# Patient Record
Sex: FEMALE | Race: WHITE
[De-identification: names, ages, dates, MRNs, and addresses within clinical notes are randomized per-mention and may not be internally consistent; named-entity substitution may affect disease eponyms.]

---

## 2017-07-23 ENCOUNTER — HOSPITAL ENCOUNTER (EMERGENCY)
Dept: HOSPITAL 62 - ER | Age: 32
Discharge: HOME | End: 2017-07-23
Payer: OTHER GOVERNMENT

## 2017-07-23 VITALS — DIASTOLIC BLOOD PRESSURE: 61 MMHG | SYSTOLIC BLOOD PRESSURE: 130 MMHG

## 2017-07-23 DIAGNOSIS — R30.0: Primary | ICD-10-CM

## 2017-07-23 DIAGNOSIS — R11.0: ICD-10-CM

## 2017-07-23 DIAGNOSIS — R10.32: ICD-10-CM

## 2017-07-23 DIAGNOSIS — R10.9: ICD-10-CM

## 2017-07-23 LAB
ALBUMIN SERPL-MCNC: 3.9 G/DL (ref 3.5–5)
ALP SERPL-CCNC: 73 U/L (ref 38–126)
ALT SERPL-CCNC: 24 U/L (ref 9–52)
ANION GAP SERPL CALC-SCNC: 13 MMOL/L (ref 5–19)
APPEARANCE UR: CLEAR
AST SERPL-CCNC: 15 U/L (ref 14–36)
BASOPHILS # BLD AUTO: 0.1 10^3/UL (ref 0–0.2)
BASOPHILS NFR BLD AUTO: 1 % (ref 0–2)
BILIRUB DIRECT SERPL-MCNC: 0.2 MG/DL (ref 0–0.4)
BILIRUB SERPL-MCNC: 0.4 MG/DL (ref 0.2–1.3)
BILIRUB UR QL STRIP: NEGATIVE
BUN SERPL-MCNC: 9 MG/DL (ref 7–20)
CALCIUM: 9 MG/DL (ref 8.4–10.2)
CHLORIDE SERPL-SCNC: 104 MMOL/L (ref 98–107)
CO2 SERPL-SCNC: 23 MMOL/L (ref 22–30)
CREAT SERPL-MCNC: 0.75 MG/DL (ref 0.52–1.25)
EOSINOPHIL # BLD AUTO: 0.5 10^3/UL (ref 0–0.6)
EOSINOPHIL NFR BLD AUTO: 4.5 % (ref 0–6)
ERYTHROCYTE [DISTWIDTH] IN BLOOD BY AUTOMATED COUNT: 13.9 % (ref 11.5–14)
GLUCOSE SERPL-MCNC: 111 MG/DL (ref 75–110)
GLUCOSE UR STRIP-MCNC: NEGATIVE MG/DL
HCT VFR BLD CALC: 38.4 % (ref 36–47)
HGB BLD-MCNC: 13.1 G/DL (ref 12–15.5)
HGB HCT DIFFERENCE: 0.9
KETONES UR STRIP-MCNC: NEGATIVE MG/DL
LYMPHOCYTES # BLD AUTO: 2.5 10^3/UL (ref 0.5–4.7)
LYMPHOCYTES NFR BLD AUTO: 23.9 % (ref 13–45)
MCH RBC QN AUTO: 27.9 PG (ref 27–33.4)
MCHC RBC AUTO-ENTMCNC: 34.1 G/DL (ref 32–36)
MCV RBC AUTO: 82 FL (ref 80–97)
MONOCYTES # BLD AUTO: 0.8 10^3/UL (ref 0.1–1.4)
MONOCYTES NFR BLD AUTO: 7.4 % (ref 3–13)
NEUTROPHILS # BLD AUTO: 6.5 10^3/UL (ref 1.7–8.2)
NEUTS SEG NFR BLD AUTO: 63.2 % (ref 42–78)
NITRITE UR QL STRIP: POSITIVE
PH UR STRIP: 5 [PH] (ref 5–9)
POTASSIUM SERPL-SCNC: 4.2 MMOL/L (ref 3.6–5)
PROT SERPL-MCNC: 7.1 G/DL (ref 6.3–8.2)
PROT UR STRIP-MCNC: NEGATIVE MG/DL
RBC # BLD AUTO: 4.69 10^6/UL (ref 3.72–5.28)
SODIUM SERPL-SCNC: 139.7 MMOL/L (ref 137–145)
SP GR UR STRIP: 1.01
UROBILINOGEN UR-MCNC: 4 MG/DL (ref ?–2)
WBC # BLD AUTO: 10.3 10^3/UL (ref 4–10.5)

## 2017-07-23 PROCEDURE — 96372 THER/PROPH/DIAG INJ SC/IM: CPT

## 2017-07-23 PROCEDURE — 36415 COLL VENOUS BLD VENIPUNCTURE: CPT

## 2017-07-23 PROCEDURE — 81001 URINALYSIS AUTO W/SCOPE: CPT

## 2017-07-23 PROCEDURE — 99284 EMERGENCY DEPT VISIT MOD MDM: CPT

## 2017-07-23 PROCEDURE — 87086 URINE CULTURE/COLONY COUNT: CPT

## 2017-07-23 PROCEDURE — 76380 CAT SCAN FOLLOW-UP STUDY: CPT

## 2017-07-23 PROCEDURE — 81025 URINE PREGNANCY TEST: CPT

## 2017-07-23 PROCEDURE — 80053 COMPREHEN METABOLIC PANEL: CPT

## 2017-07-23 PROCEDURE — 85025 COMPLETE CBC W/AUTO DIFF WBC: CPT

## 2017-07-23 PROCEDURE — S0119 ONDANSETRON 4 MG: HCPCS

## 2017-07-23 NOTE — RADIOLOGY REPORT (SQ)
EXAM DESCRIPTION:  CT LTD RENAL STONE PROTOCOL ON



COMPLETED DATE/TIME:  7/23/2017 9:20 pm



REASON FOR STUDY:  left flank pain, UTI, ? obstructing stone



COMPARISON:  None.



TECHNIQUE:  CT scan of the abdomen and pelvis performed without intravenous or oral contrast. Images 
reviewed with lung, soft tissue, and bone windows. Reconstructed coronal and sagittal MPR images revi
ewed. All images stored on PACS.

All CT scanners at this facility use dose modulation, iterative reconstruction, and/or weight based d
osing when appropriate to reduce radiation dose to as low as reasonably achievable (ALARA).

[facility]: Dose Right  [facility]: CareDose    [facility]: Dose Right    [facility]: Teradose 4D    [facility]: Smart Technologies



RADIATION DOSE:  Up-to-date CT equipment and radiation dose reduction techniques were employed. CTDIv
ol: 18.9 mGy. DLP: 1019 mGy-cm.mGy.



LIMITATIONS:  None.



FINDINGS:  LOWER CHEST: No significant findings. No nodules or infiltrates.

NON-CONTRASTED LIVER, SPLEEN, ADRENALS: Evaluation limited by lack of IV contrast. No identified sign
ificant masses.

PANCREAS: No masses. No peripancreatic inflammatory changes.

GALLBLADDER: No identified stones by CT criteria. No inflammatory changes to suggest cholecystitis.

RIGHT KIDNEY AND URETER: No suspicious masses. Assessment limited by lack of IV contrast.   No signif
icant calcifications.   No hydronephrosis or hydroureter.

LEFT KIDNEY AND URETER: No suspicious masses. Assessment limited by lack of IV contrast.   No signifi
cant calcifications.   No hydronephrosis or hydroureter.

AORTA AND RETROPERITONEUM: No aneurysm. No retroperitoneal masses or adenopathy.

BOWEL AND PERITONEAL CAVITY: No obvious masses or inflammatory changes. No free fluid.  Multiple prom
inent mesenteric lymph nodes are identified.  This is a nonspecific finding but could be related to a
 mesenteric adenitis.

APPENDIX: Normal.

PELVIS, BLADDER, AND ABDOMINAL WALL:No abnormal masses. No free fluid. Bladder normal.

BONES: No significant findings.

OTHER: No other significant finding.



IMPRESSION:  Multiple prominent mesenteric lymph nodes are identified as noted above.  This is a nons
pecific finding but could be related to mesenteric adenitis.  No renal or ureteric calculi are identi
fied.  Other findings as noted above



TECHNICAL DOCUMENTATION:  JOB ID:  [id number]

Quality ID # [id number]: Final reports with documentation of one or more dose reduction techniques (e.g., Au
tomated exposure control, adjustment of the mA and/or kV according to patient size, use of iterative 
reconstruction technique)

 2011 [employer]- All Rights Reserved

## 2017-07-23 NOTE — ER DOCUMENT REPORT
ED GI/





- General


Chief Complaint: Possible Kidney Stone


Stated Complaint: PAIN URINATING/HEADACHE/LEFT FLANK PAIN


Time Seen by Provider: 17 19:55


Notes: 


Patient is a 32-year-old female that comes emergency department for chief 

complaint of flank pain that is worse on the left side and radiating around to 

her left lower abdomen, nausea, and also "pain in the urethra intermittently".  

She states last time she had this symptom she had a kidney stone. She denies 

fever. She was placed on Macrobid and Pyridium 3 days ago, states she is has 

not improved. She denies vaginal discharge or bleeding. She states she suffers 

from chronic urinary tract infections.  She denies any daily medications, only 

surgeries reported are C-sections.  She denies any other medical history.


TRAVEL OUTSIDE OF THE [place] IN LAST 30 DAYS: No





- Related Data


Allergies/Adverse Reactions: 


 





amoxicillin Allergy (Verified 17 19:48)


 


metoclopramide [From Reglan] Allergy (Verified 17 19:48)


 


Penicillins Allergy (Verified 17 19:38)


 


prochlorperazine [From Compazine] Allergy (Verified 17 19:38)


 


promethazine [From Phenergan] Allergy (Verified 17 19:38)


 


Sulfa (Sulfonamide Antibiotics) Allergy (Verified 17 19:38)


 











Past Medical History





- General


Information source: Patient





- Social History


Smoking Status: Never Smoker


Frequency of alcohol use: None


Drug Abuse: None


Lives with: Family


Family History: Reviewed & Not Pertinent


Renal/ Medical History: Reports: Hx Kidney Stones.  Denies: Hx Peritoneal 

Dialysis


Musculoskeltal Medical History: Reports Hx Arthritis - RA


Past Surgical History: Reports: Hx  Section, Hx Oral Surgery - wisdom 

teeth





Review of Systems





- Review of Systems


Constitutional: No symptoms reported


EENT: No symptoms reported


Cardiovascular: No symptoms reported


Respiratory: No symptoms reported


Gastrointestinal: See HPI


Genitourinary: See HPI


Female Genitourinary: No symptoms reported


Musculoskeletal: No symptoms reported


Skin: No symptoms reported


Hematologic/Lymphatic: No symptoms reported


Neurological/Psychological: No symptoms reported





Physical Exam





- Vital signs


Vitals: 


 











Temp Pulse Resp BP Pulse Ox


 


 98.5 F   99   20   130/61 H  99 


 


 17 19:37  17 19:37  17 19:37  17 19:37  17 19:37











Interpretation: Normal





- General


General appearance: Appears well, Alert


In distress: None





- HEENT


Head: Normocephalic, Atraumatic


Eyes: Normal


Conjunctiva: Normal


Extraocular movements intact: Yes


Eyelashes: Normal


Pupils: PERRL


Nasal: Normal


Mouth/Lips: Normal


Mucous membranes: Normal


Pharynx: Normal


Neck: Normal





- Respiratory


Respiratory status: No respiratory distress


Chest status: Nontender


Breath sounds: Normal.  No: Decreased air movement, Wheezing


Chest palpation: Normal





- Cardiovascular


Rhythm: Regular.  No: Tachycardia


Heart sounds: Normal auscultation, S1 appreciated, S2 appreciated


Murmur: No





- Abdominal


Inspection: Normal


Distension: No distension


Bowel sounds: Normal


Tenderness: Nontender.  No: Tender, Guarding - Completely nontender and every 

quadrant, no rigidity, no guarding


Organomegaly: No organomegaly





- Back


Back: Normal, Nontender.  No: Tender, CVA tenderness





- Extremities


General upper extremity: Normal inspection, Nontender, Normal color, Normal ROM

, Normal temperature


General lower extremity: Normal inspection, Nontender, Normal color, Normal ROM

, Normal temperature, Normal weight bearing.  No: Ana's sign





- Neurological


Neuro grossly intact: Yes


Cognition: Normal


Orientation: AAOx4


[place] Coma Scale Eye Opening: Spontaneous


Sim Coma Scale Verbal: Oriented


[place] Coma Scale Motor: Obeys Commands


Sim Coma Scale Total: 15


Speech: Normal


Motor strength normal: LUE, RUE, LLE, RLE


Sensory: Normal





- Psychological


Associated symptoms: Normal affect, Normal mood





- Skin


Skin Temperature: Warm


Skin Moisture: Dry


Skin Color: Normal





Course





- Re-evaluation


Re-evalutation: 


Patient does not appear to be in any distress, no tachycardia, no hypotension, 

no fever.  CBC and chemistry unremarkable.





Patient does not have CVA tenderness or abdominal tenderness on my examination.

  Patient insists that she is having stone, there is no recent imaging, she 

patient states she has not had any recent imaging either here or back home.  

Patient requests a CAT scan.  Urine does show nitrates, red blood cells.  We 

will perform CAT scan to rule out obstructive stone with infection.





CT shows no nephrolithiasis or ureterolithiasis, does show incidental 

mesenteric lymphadenopathy, however patient has no fever, leukocytosis, or 

other concerning abnormality suggesting underlying concerning abnormality other 

than urinary tract infection.  Patient given Rocephin, will be placed on Keflex

, urine culture, discussed results with patient in detail, discussed follow-up, 

discussed return precautions, patient states understanding and agreement.





- Vital Signs


Vital signs: 


 











Temp Pulse Resp BP Pulse Ox


 


 98.5 F   99   20   130/61 H  99 


 


 17 19:37  17 19:37  17 19:37  17 19:37  17 19:37














- Laboratory


Result Diagrams: 


 17 20:20





 17 20:20


Laboratory results interpreted by me: 


 











  17





  19:50 20:20


 


Glucose   111 H


 


Urine Blood  LARGE H 


 


Urine Nitrite  POSITIVE H 


 


Urine Urobilinogen  4.0 H 














Discharge





- Discharge


Clinical Impression: 


 Flank pain, Dysuria





Condition: Stable


Disposition: HOME, SELF-CARE


Additional Instructions: 


No kidney stone is seen either in your kidneys or passing through the urinary 

tract.  There is some swollen lymph nodes, this is nonspecific, could be from 

recent viral illness or other abnormality. Your urine indicates an infection. 

You have been given Rocephin tonight, take the antibiotics prescribed to 

completion. 





Stop macrobid. Continue pyridium. 





Follow-up with primary care.  Return to emergency department for any concerning 

or worsening symptoms including fever, vomiting, etc.


Prescriptions: 


Cephalexin Monohydrate [Keflex 500 mg Capsule] 500 mg PO BID #14 capsule

## 2017-08-23 ENCOUNTER — HOSPITAL ENCOUNTER (EMERGENCY)
Dept: HOSPITAL 62 - ER | Age: 32
Discharge: HOME | End: 2017-08-23
Payer: OTHER GOVERNMENT

## 2017-08-23 VITALS — SYSTOLIC BLOOD PRESSURE: 126 MMHG | DIASTOLIC BLOOD PRESSURE: 81 MMHG

## 2017-08-23 DIAGNOSIS — H66.92: ICD-10-CM

## 2017-08-23 DIAGNOSIS — H60.502: ICD-10-CM

## 2017-08-23 DIAGNOSIS — H92.02: Primary | ICD-10-CM

## 2017-08-23 PROCEDURE — 99282 EMERGENCY DEPT VISIT SF MDM: CPT

## 2017-08-23 NOTE — ER DOCUMENT REPORT
HPI





- HPI


Pain Level: 4


Context: 





31 yo female c/o left ear pain x 1 week.  no fever


Associated Symptoms: None


Exacerbated by: Denies


Relieved by: Denies





- ROS


Systems Reviewed and Negative: Yes All other systems reviewed and negative





- DERM


Skin Color: Normal





Past Medical History





- General


Information source: Patient





- Social History


Smoking Status: Never Smoker


Frequency of alcohol use: None


Drug Abuse: None


Lives with: Family


Family History: Reviewed & Not Pertinent


Patient has suicidal ideation: No


Patient has homicidal ideation: No


Renal/ Medical History: Reports: Hx Kidney Stones.  Denies: Hx Peritoneal 

Dialysis


Musculoskeltal Medical History: Reports Hx Arthritis - RA


Past Surgical History: Reports: Hx  Section, Hx Oral Surgery - wisdom 

teeth





Vertical Provider Document





- CONSTITUTIONAL


Agree With Documented VS: Yes


Exam Limitations: No Limitations





- INFECTION CONTROL


TRAVEL OUTSIDE OF THE [place] IN LAST 30 DAYS: No





- HEENT


HEENT: Atraumatic, PERRLA


Notes: 





+ pus behind left TM.  left EAC erythematous and edematous.  + pre and post 

auricular tenderness.  mastoid nontender





- NECK


Neck: Normal Inspection, Supple





- RESPIRATORY


Respiratory: Breath Sounds Normal, No Respiratory Distress


O2 Sat by Pulse Oximetry: 98





- CARDIOVASCULAR


Cardiovascular: Regular Rate, Regular Rhythm





- MUSCULOSKELETAL/EXTREMETIES


Musculoskeletal/Extremeties: MAEW, FROM





- NEURO


Level of Consciousness: Awake, Alert, Appropriate





- DERM


Integumentary: Warm, Dry





Course





- Vital Signs


Vital signs: 


 











Temp Pulse Resp BP Pulse Ox


 


 97.9 F   101 H  18   121/71   98 


 


 17 17:32  17 17:32  17 17:32  17 17:32  17 17:32














Discharge





- Discharge


Clinical Impression: 


 Acute left otitis media





Acute otitis externa of left ear


Qualifiers:


 Otitis externa type: unspecified type Qualified Code(s): H60.502 - Unspecified 

acute noninfective otitis externa, left ear





Condition: Stable


Disposition: HOME, SELF-CARE


Instructions:  Use of Ear Drops ([facility]), Otitis Externa ([facility]), Otitis Media ([facility])

, Antibiotic Therapy ([facility])


Additional Instructions: 


You have an inner and outer ear infection


take all antibiotic as prescribed


follow up with primary care if symptoms persist


Prescriptions: 


Cefdinir [Omnicef 300 mg Capsule] 1 cap PO BID #30 capsule


Ciprofloxacin HCl/Dexameth [Ciprodex Otic Suspension 7.5 ml Bottle] 4 drop OT 

BID #1 bottle

## 2017-10-03 ENCOUNTER — HOSPITAL ENCOUNTER (OUTPATIENT)
Dept: HOSPITAL 62 - RAD | Age: 32
End: 2017-10-03
Attending: FAMILY MEDICINE
Payer: OTHER GOVERNMENT

## 2017-10-03 DIAGNOSIS — M54.2: Primary | ICD-10-CM

## 2017-10-03 DIAGNOSIS — M50.323: ICD-10-CM

## 2017-10-03 PROCEDURE — 72141 MRI NECK SPINE W/O DYE: CPT

## 2017-10-03 NOTE — RADIOLOGY REPORT (SQ)
EXAM DESCRIPTION:  MRI CERVICAL SPINE WITHOUT



COMPLETED DATE/TIME:  10/3/2017 4:26 pm



REASON FOR STUDY:  CERVICALGIA M54.2  CERVICALGIA



COMPARISON:  CT cervical spine 4/4/2017



TECHNIQUE:  Sagittal and Axial imaging includes T1, T2, STIR and gradient echo sequences.



LIMITATIONS:  None.



FINDINGS:  ALIGNMENT: Normal.

VERTEBRAE: Intact.

BONE MARROW: Normal. No marrow replacement or reactive changes.

DISCS: Diffuse decreased T2 weighted intervertebral disc signal.  Disc space loss of height at C5-6 a
nd C6-7.

HARDWARE: None in the spine.

CORD AND BASE OF BRAIN: Normal in size and signal intensity.

SOFT TISSUES: No soft tissue masses.

C1-C2: No significant spinal stenosis.

C2-C3: No significant spinal stenosis or exit foraminal stenosis.

C3-C4: No significant spinal stenosis or exit foraminal stenosis.

C4-C5: No significant spinal stenosis or exit foraminal stenosis.

C5-C6: Broad diffuse posterior disc bulging is present, effacing the ventral thecal sac and abutting 
the ventral cord without cord flattening or abnormal intrinsic cord signal.  No significant foraminal
 narrowing.

C6-C7: Broad diffuse posterior disc bulging is present with a small left paracentral protrusion.  Thi
s effaces the ventral thecal sac and abuts the ventral cord without cord flattening or abnormal intri
nsic cord signal.  No significant foraminal stenosis.

C7-T1: No significant spinal stenosis or exit foraminal stenosis.

UPPER THORACIC: Incompletely imaged. No significant spinal stenosis or exit foraminal stenosis.

OTHER: No other significant finding.



IMPRESSION:  Degenerative disc changes at C5-6 and C6-7 as above.



TECHNICAL DOCUMENTATION:  JOB ID:  [id number]

 [employer]- All Rights Reserved

## 2018-02-12 ENCOUNTER — HOSPITAL ENCOUNTER (EMERGENCY)
Dept: HOSPITAL 62 - ER | Age: 33
LOS: 1 days | Discharge: STILL ADMITTED OR ADMINISTRATIVE | End: 2018-02-13
Payer: OTHER GOVERNMENT

## 2018-02-12 DIAGNOSIS — R09.02: Primary | ICD-10-CM

## 2018-02-12 DIAGNOSIS — R00.0: ICD-10-CM

## 2018-02-12 DIAGNOSIS — R09.89: ICD-10-CM

## 2018-02-12 DIAGNOSIS — R06.2: ICD-10-CM

## 2018-02-12 DIAGNOSIS — R51: ICD-10-CM

## 2018-02-12 DIAGNOSIS — Z79.899: ICD-10-CM

## 2018-02-12 LAB
ADD MANUAL DIFF: YES
ANION GAP SERPL CALC-SCNC: 14 MMOL/L (ref 5–19)
BUN SERPL-MCNC: 9 MG/DL (ref 7–20)
CALCIUM: 9.8 MG/DL (ref 8.4–10.2)
CHLORIDE SERPL-SCNC: 103 MMOL/L (ref 98–107)
CO2 SERPL-SCNC: 25 MMOL/L (ref 22–30)
ERYTHROCYTE [DISTWIDTH] IN BLOOD BY AUTOMATED COUNT: 14.6 % (ref 11.5–14)
GLUCOSE SERPL-MCNC: 100 MG/DL (ref 75–110)
HCT VFR BLD CALC: 42 % (ref 36–47)
HGB BLD-MCNC: 14 G/DL (ref 12–15.5)
MCH RBC QN AUTO: 27.1 PG (ref 27–33.4)
MCHC RBC AUTO-ENTMCNC: 33.4 G/DL (ref 32–36)
MCV RBC AUTO: 81 FL (ref 80–97)
PLATELET # BLD: 262 10^3/UL (ref 150–450)
POTASSIUM SERPL-SCNC: 4.3 MMOL/L (ref 3.6–5)
RBC # BLD AUTO: 5.17 10^6/UL (ref 3.72–5.28)
SODIUM SERPL-SCNC: 141.7 MMOL/L (ref 137–145)
WBC # BLD AUTO: 14.5 10^3/UL (ref 4–10.5)

## 2018-02-12 PROCEDURE — 96361 HYDRATE IV INFUSION ADD-ON: CPT

## 2018-02-12 PROCEDURE — 94640 AIRWAY INHALATION TREATMENT: CPT

## 2018-02-12 PROCEDURE — 36415 COLL VENOUS BLD VENIPUNCTURE: CPT

## 2018-02-12 PROCEDURE — 99285 EMERGENCY DEPT VISIT HI MDM: CPT

## 2018-02-12 PROCEDURE — 70360 X-RAY EXAM OF NECK: CPT

## 2018-02-12 PROCEDURE — 96376 TX/PRO/DX INJ SAME DRUG ADON: CPT

## 2018-02-12 PROCEDURE — 96365 THER/PROPH/DIAG IV INF INIT: CPT

## 2018-02-12 PROCEDURE — 70491 CT SOFT TISSUE NECK W/DYE: CPT

## 2018-02-12 PROCEDURE — 96375 TX/PRO/DX INJ NEW DRUG ADDON: CPT

## 2018-02-12 PROCEDURE — 85025 COMPLETE CBC W/AUTO DIFF WBC: CPT

## 2018-02-12 PROCEDURE — 71045 X-RAY EXAM CHEST 1 VIEW: CPT

## 2018-02-12 PROCEDURE — 80048 BASIC METABOLIC PNL TOTAL CA: CPT

## 2018-02-12 PROCEDURE — 84703 CHORIONIC GONADOTROPIN ASSAY: CPT

## 2018-02-12 RX ADMIN — SODIUM CHLORIDE PRN ML: 9 INJECTION, SOLUTION INTRAVENOUS at 23:42

## 2018-02-12 NOTE — ER DOCUMENT REPORT
ED General





- General


Chief Complaint: Flu Symptoms


Stated Complaint: HEADACHE,RUNNY NOSE


Time Seen by Provider: 18 22:47


TRAVEL OUTSIDE OF THE [place] IN LAST 30 DAYS: No





- Related Data


Allergies/Adverse Reactions: 


 





amoxicillin Allergy (Verified 17 17:37)


 


metoclopramide [From Reglan] Allergy (Verified 17 17:37)


 


Penicillins Allergy (Verified 17 17:37)


 


prochlorperazine [From Compazine] Allergy (Verified 17 17:37)


 


promethazine [From Phenergan] Allergy (Verified 17 17:37)


 


Sulfa (Sulfonamide Antibiotics) Allergy (Verified 17 17:37)


 











Past Medical History





- Social History


Smoking Status: Never Smoker


Frequency of alcohol use: None


Drug Abuse: None


Family History: Reviewed & Not Pertinent


Patient has suicidal ideation: No


Patient has homicidal ideation: No


Renal/ Medical History: Reports: Hx Kidney Stones.  Denies: Hx Peritoneal 

Dialysis


Musculoskeltal Medical History: Reports Hx Arthritis - RA


Past Surgical History: Reports: Hx  Section, Hx Oral Surgery - wisdom 

teeth





- Immunizations


Hx Diphtheria, Pertussis, Tetanus Vaccination: Yes





Physical Exam





- Vital signs


Vitals: 


 











Temp Pulse Resp BP Pulse Ox


 


 98.5 F   130 H  28 H  132/93 H  100 


 


 18 22:20  18 22:20  18 22:20  18 22:20  18 22:20














Course





- Re-evaluation


Re-evalutation: 





18 23:58


On exam patient initially is making wheezing type noise with her upper airway.  

It is not with inspiration appears more with exhalation.  Her lung fields are 

clear.  When I get her to start talking she speaks in full sentences without 

difficulty.  This seems to be more a laryngitis and anything.  She does not 

currently have a fever.  Being that she still has a squeezing type noise that 

seems to come from upper airway I will obtain a CT scan of her soft tissue the 

neck and also give her racemic epi treatment as I think symptomatically this 

will help her. 


18 01:36








Patient is having a reaction to the Levaquin.  She is itching.  Levaquin was 

ordered in triage.  The nurse says it has already been given.  I have ordered 

some Benadryl for the patient.


18 02:12








Patient continues a she is short of breath and is taking short breaths.  

Despite this she is able to speak in full sentences without pausing her 

breathing and her pulse ox is 100%.


18 04:03





The patient continued to appear short of breath Isotec to ambulate the patient 

keep the pulse ox and monitor on the patient.  One in the room and detected 

this.  Since the patient stood up her heart rate went from 110-140.  Her pulse 

ox dropped to 84% and her pulse oximeter had a very good plus.  Patient 

immediately started to look unwell.  I therefore had the patient lay back in 

bed and placed oxygen on her.  With 2 L oxygen at rest she is 100%.  Due to the 

patient's hypoxemia with exertion and her continue to clinically look and feel 

short of breath and felt appropriate to admit her.  I will place her on 

Tamiflu.  I did consider the possibility of pulmonary embolism however she has 

no risk factors for pulmonary embolism other than being little bit overweight 

and her symptoms started when she started having flulike symptoms the same time 

that her  had flulike symptoms.  Also patient is already had 1 contrast 

dye load when she had a CT of her neck.  I think is best to wait 24 hours with 

observation to see if the patient's symptoms improve.  If they do not improve 

she may need reconsideration of a CT of the chest.  I did speak with Dr. Rossi, hospitalist, who agrees to admit the patient.





Dictation of this chart was performed using voice recognition software; 

therefore, there may be some unintended grammatical errors.


18 05:31





Patient now has inform the nurse that she does not want to stay.  She requests 

to leave.  I did go back in the room informed patient that her admission orders 

have been entered by the hospitalist that I recommend that she states it was is 

still not 100% clear why exactly she is so short of breath and why she is 

having low oxygen whenever she gets up and walks.  Patient says that she feels 

that she definitely has the flu.  She says she does not like the risk-benefit 

profile Tamiflu and therefore she does not want to take that.  She says that 

she has had flu once in the past and felt like this was able to rest at home 

get better.  Informed her that at home she does not have the ability to measure 

her oxygen and does not have the ability to immediately intervene if she gets 

worse.  Patient says she understands this but still does not want stay the 

hospital and feels that she will do better at home.  Patient's  is at 

bedside and his entire conversation.  He understands my concerns.  I informed 

the patient and her  that we want her to return to ER anytime so can 

reevaluate her and make sure she is improving.  I informed them that just 

because she is signing out AMA that does not mean that she is not welcome 

there.  I informed them that we want her to come back as we want what is best 

for her.  Patient and  show appreciation of this and they do agree to 

return to ER if she starts worsening in any way.





Dictation of this chart was performed using voice recognition software; 

therefore, there may be some unintended grammatical errors.





- Vital Signs


Vital signs: 


 











Temp Pulse Resp BP Pulse Ox


 


 98.5 F   130 H  28 H  132/93 H  99 


 


 18 22:20  18 22:20  18 22:20  18 22:20  18 22:55














- Laboratory


Result Diagrams: 


 18 23:17





 18 23:17


Laboratory results interpreted by me: 


 











  18





  23:17


 


WBC  14.5 H


 


RDW  14.6 H


 


Abs Neuts (Manual)  9.1 H














Discharge





- Discharge


Clinical Impression: 


 Flu-like symptoms, Hypoxemia, Tachycardia





Condition: Stable


Disposition: ADMITTED AS OBSERVATION


Admitting Provider: Hospitalist


Unit Admitted: Telemetry


Additional Instructions: 


We respect your decision to want to sign out against medical advice. As 

discussed with you my concern is that you will have recurrent episodes of low 

oxygen concentrations in your blood which will lead to you becoming more sick 

and could put you into distress which could eventually lead to death if 

untreated. Please have a very low threshold to return to the ER at anytime if 

you feel that you are worsening in any way or feel unwell. Please return to the 

ER at any time as we want what is best for you. Please follow up with your 

doctor in the next 24 hours if you do not return to the ER.


Referrals: 


JASMIN PAIZ PA-C [Primary Care Provider] - 18

## 2018-02-12 NOTE — ER DOCUMENT REPORT
ED Medical Screen (RME)





- General


Chief Complaint: Flu Symptoms


Stated Complaint: HEADACHE,RUNNY NOSE


Time Seen by Provider: 18 22:47


Notes: 





Patient is a 32-year-old female presents emergency department with chief 

complaint of flulike symptoms but now with sudden onset shortness of breath and 

difficulty breathing this afternoon.  Patient states that she has had nausea 

and epigastric discomfort that is tied down about 3 days ago then admits to 

sinus congestion yesterday but today admits to sore throat and no difficulty 

breathing.  She states that it is difficult for her to breathe and she has a 

sore throat.  She denies any sick contacts.   allergy to penicillin and sulfa


TRAVEL OUTSIDE OF THE [place] IN LAST 30 DAYS: No





- Related Data


Allergies/Adverse Reactions: 


 





amoxicillin Allergy (Verified 17 17:37)


 


metoclopramide [From Reglan] Allergy (Verified 17 17:37)


 


Penicillins Allergy (Verified 17 17:37)


 


prochlorperazine [From Compazine] Allergy (Verified 17 17:37)


 


promethazine [From Phenergan] Allergy (Verified 17 17:37)


 


Sulfa (Sulfonamide Antibiotics) Allergy (Verified 17 17:37)


 











Past Medical History





- Social History


Frequency of alcohol use: None


Drug Abuse: None


Renal/ Medical History: Reports: Hx Kidney Stones.  Denies: Hx Peritoneal 

Dialysis


Musculoskeltal Medical History: Reports Hx Arthritis - RA


Past Surgical History: Reports: Hx  Section, Hx Oral Surgery - wisdom 

teeth





- Immunizations


Hx Diphtheria, Pertussis, Tetanus Vaccination: Yes





Physical Exam





- Vital signs


Vitals: 





 











Temp Pulse Resp BP Pulse Ox


 


 98.5 F   130 H  28 H  132/93 H  100 


 


 18 22:20  18 22:20  18 22:20  18 22:20  18 22:20














- Notes


Notes: 


PHYSICAL EXAM


GENERAL: Alert, interacts well. 


HEENT: NCAT, pale conjunctiva, extraocular movements intact, pupils PERRL. 

external ear normal, no evidence of external auditory canal tenderness, blood/

drainage, cerumen impaction, TM intact without evidence of effusion, bulging, 

injection, MMM, Uvula midline.  Airway patent.  No visualized epiglottis.  No 

evidence of tonsillar enlargement, peritonsillar abscess, retropharyngeal 

abscess.


LUNGS: Clear to auscultation bilaterally, no wheezes, rales, or rhonchi. No 

respiratory distress.


HEART: Tachycardic rate and rhythm. No murmurs, gallops, or rubs.


NEUROLOGICAL: Alert and oriented x4. Normal speech.


PSYCH: Normal affect, normal mood.








Course





- Vital Signs


Vital signs: 





 











Temp Pulse Resp BP Pulse Ox


 


 98.5 F   130 H  28 H  132/93 H  99 


 


 18 22:20  18 22:20  18 22:20  18 22:20  18 22:55

## 2018-02-12 NOTE — RADIOLOGY REPORT (SQ)
EXAM DESCRIPTION: 



SOFT TISSUE NECK



CLINICAL HISTORY: 



32 years, Female, stridor and sore throat



COMPARISON:

None.



NUMBER OF VIEWS:

Two



TECHNIQUE:

Frontal and lateral



LIMITATIONS:

None.



FINDINGS:



Nasopharynx appears patent. Prevertebral soft tissues are of

normal thickness.



Mild reversed lordotic curvature, moderate C5-C6 disc

desiccation, mild cervicothoracic levo convexity.



IMPRESSION:



Mild reversed lordotic curvature of the cervical spine which may

indicate soft tissue injury or spasm. Moderate C5-C6 disc

desiccation.

## 2018-02-13 VITALS — SYSTOLIC BLOOD PRESSURE: 128 MMHG | DIASTOLIC BLOOD PRESSURE: 72 MMHG

## 2018-02-13 LAB
ABSOLUTE LYMPHOCYTES# (MANUAL): 4.1 10^3/UL (ref 0.5–4.7)
ABSOLUTE MONOCYTES # (MANUAL): 0.7 10^3/UL (ref 0.1–1.4)
ABSOLUTE NEUTROPHILS# (MANUAL): 9.1 10^3/UL (ref 1.7–8.2)
ANISOCYTOSIS BLD QL SMEAR: SLIGHT
BASOPHILS NFR BLD MANUAL: 0 % (ref 0–2)
EOSINOPHIL NFR BLD MANUAL: 4 % (ref 0–6)
MONOCYTES % (MANUAL): 5 % (ref 3–13)
PLATELET COMMENT: ADEQUATE
POLYCHROMASIA BLD QL SMEAR: SLIGHT
SEGMENTED NEUTROPHILS % (MAN): 63 % (ref 42–78)
TOTAL CELLS COUNTED BLD: 100
VARIANT LYMPHS NFR BLD MANUAL: 28 % (ref 13–45)

## 2018-02-13 RX ADMIN — SODIUM CHLORIDE PRN ML: 9 INJECTION, SOLUTION INTRAVENOUS at 00:58

## 2018-02-13 NOTE — RADIOLOGY REPORT (SQ)
EXAM DESCRIPTION: 



CHEST SINGLE VIEW



CLINICAL HISTORY: 



32 years, Female, cough



COMPARISON:

None.







FINDINGS:



Normal lung volume, clear parenchyma, normal cardiac silhouette,

and intact bony thorax.



IMPRESSION:



No acute cardiopulmonary findings.

## 2018-03-31 ENCOUNTER — HOSPITAL ENCOUNTER (EMERGENCY)
Dept: HOSPITAL 62 - ER | Age: 33
Discharge: HOME | End: 2018-03-31
Payer: COMMERCIAL

## 2018-03-31 VITALS — DIASTOLIC BLOOD PRESSURE: 88 MMHG | SYSTOLIC BLOOD PRESSURE: 131 MMHG

## 2018-03-31 DIAGNOSIS — L60.0: Primary | ICD-10-CM

## 2018-03-31 PROCEDURE — 99283 EMERGENCY DEPT VISIT LOW MDM: CPT

## 2018-03-31 NOTE — ER DOCUMENT REPORT
HPI





- HPI


Patient complains to provider of: Right great toenail ingrown


Onset: Other - this week


Onset/Duration: Gradual


Quality of pain: Throbbing


Pain Level: 3


Context: 





32-year-old female complaining of ingrown toenail this week.  There is a little 

bit of drainage in it started after she got a pedicure.  No history of MRSA.


Associated Symptoms: None


Exacerbated by: Other - shoes


Relieved by: Denies


Similar symptoms previously: No


Recently seen / treated by doctor: No





- ROS


ROS below otherwise negative: Yes


Systems Reviewed and Negative: Yes All other systems reviewed and negative





Past Medical History





- General


Information source: Patient





- Social History


Smoking Status: Never Smoker


Frequency of alcohol use: None


Drug Abuse: None


Lives with: Family


Family History: Reviewed & Not Pertinent


Renal/ Medical History: Reports: Hx Kidney Stones.  Denies: Hx Peritoneal 

Dialysis


Musculoskeltal Medical History: Reports Hx Arthritis - RA


Past Surgical History: Reports: Hx  Section, Hx Oral Surgery - wisdom 

teeth





- Immunizations


Hx Diphtheria, Pertussis, Tetanus Vaccination: Yes





Vertical Provider Document





- CONSTITUTIONAL


Agree With Documented VS: Yes


Exam Limitations: No Limitations





- INFECTION CONTROL


TRAVEL OUTSIDE OF THE [place] IN LAST 30 DAYS: No





- HEENT


HEENT: Normocephalic





- NECK


Neck: Supple





- MUSCULOSKELETAL/EXTREMETIES


Musculoskeletal/Extremeties: MAEW, Tender - inflamed right great fibular side 

ingrown toenail with some exudate





- NEURO


Level of Consciousness: Awake, Alert


Motor/Sensory: No Motor Deficit, No Sensory Deficit





- DERM


Integumentary: Warm, Dry





Course





- Re-evaluation


Re-evalutation: 





18 18:36


Patient is okay with cephalexin





Discharge





- Discharge


Clinical Impression: 


 right gt ingrown toenail





Condition: Good


Disposition: HOME, SELF-CARE


Instructions:  Ingrown Nail ([facility]), Cephalexin ([facility]), Acetaminophen, Use of Over-

The-Counter Ibuprofen ([facility])


Additional Instructions: 


soak foot in warm soapy water twice a day


keflex antibiotics


keep nail filed straight and shorter


lift nail and pull skin aside as instructed


see podiatrist if it persists


Prescriptions: 


Cephalexin Monohydrate [Keflex 500 mg Capsule] 500 mg PO QID #28 capsule


Referrals: 


VALERIE BLAIR DPM [ACTIVE STAFF] - Follow up as needed

## 2018-05-01 ENCOUNTER — HOSPITAL ENCOUNTER (OUTPATIENT)
Dept: HOSPITAL 62 - OROUT | Age: 33
Discharge: HOME | End: 2018-05-01
Attending: OBSTETRICS & GYNECOLOGY
Payer: COMMERCIAL

## 2018-05-01 VITALS — DIASTOLIC BLOOD PRESSURE: 83 MMHG | SYSTOLIC BLOOD PRESSURE: 132 MMHG

## 2018-05-01 DIAGNOSIS — Z88.2: ICD-10-CM

## 2018-05-01 DIAGNOSIS — Z88.8: ICD-10-CM

## 2018-05-01 DIAGNOSIS — G40.909: ICD-10-CM

## 2018-05-01 DIAGNOSIS — Z88.0: ICD-10-CM

## 2018-05-01 DIAGNOSIS — N90.7: Primary | ICD-10-CM

## 2018-05-01 LAB
APPEARANCE UR: CLEAR
APTT PPP: YELLOW S
BILIRUB UR QL STRIP: NEGATIVE
ERYTHROCYTE [DISTWIDTH] IN BLOOD BY AUTOMATED COUNT: 14.1 % (ref 11.5–14)
GLUCOSE UR STRIP-MCNC: NEGATIVE MG/DL
HCT VFR BLD CALC: 40.3 % (ref 36–47)
HGB BLD-MCNC: 13.5 G/DL (ref 12–15.5)
KETONES UR STRIP-MCNC: NEGATIVE MG/DL
MCH RBC QN AUTO: 27.1 PG (ref 27–33.4)
MCHC RBC AUTO-ENTMCNC: 33.4 G/DL (ref 32–36)
MCV RBC AUTO: 81 FL (ref 80–97)
NITRITE UR QL STRIP: NEGATIVE
PH UR STRIP: 7 [PH] (ref 5–9)
PLATELET # BLD: 237 10^3/UL (ref 150–450)
PROT UR STRIP-MCNC: NEGATIVE MG/DL
RBC # BLD AUTO: 4.98 10^6/UL (ref 3.72–5.28)
SP GR UR STRIP: 1.02
UROBILINOGEN UR-MCNC: 2 MG/DL (ref ?–2)
WBC # BLD AUTO: 10.9 10^3/UL (ref 4–10.5)

## 2018-05-01 PROCEDURE — 85027 COMPLETE CBC AUTOMATED: CPT

## 2018-05-01 PROCEDURE — 88312 SPECIAL STAINS GROUP 1: CPT

## 2018-05-01 PROCEDURE — 36415 COLL VENOUS BLD VENIPUNCTURE: CPT

## 2018-05-01 PROCEDURE — 81025 URINE PREGNANCY TEST: CPT

## 2018-05-01 PROCEDURE — 81001 URINALYSIS AUTO W/SCOPE: CPT

## 2018-05-01 PROCEDURE — 11420 EXC H-F-NK-SP B9+MARG 0.5/<: CPT

## 2018-05-01 PROCEDURE — 88305 TISSUE EXAM BY PATHOLOGIST: CPT

## 2018-05-01 RX ADMIN — FENTANYL CITRATE ONE MCG: 50 INJECTION INTRAMUSCULAR; INTRAVENOUS at 15:15

## 2018-05-01 RX ADMIN — FENTANYL CITRATE ONE MCG: 50 INJECTION INTRAMUSCULAR; INTRAVENOUS at 15:10

## 2018-05-01 NOTE — OPERATIVE REPORT E
Operative Report



NAME: GAY MC

MRN:  [id number]          : 1985 AGE:  32Y

DATE OF SURGERY: 2018              ROOM:



PREOPERATIVE DIAGNOSIS:

Left Bartholin cyst.



POSTOPERATIVE DIAGNOSIS:

Left labial cyst.



PROCEDURE:

Excision of left labial cyst.



SURGEON:

AIDE SCALES M.D.



ANESTHESIA:

Dr. Hdez with conscious sedation.



ESTIMATED BLOOD LOSS:

20 mL.



SPECIMENS REMOVED:

Left labial cyst.



PROCEDURE IN DETAIL:

Patient was taken to the operating room, prepared and draped in a normal

sterile fashion in dorsal lithotomy position.  Under sterile conditions an

in-and-out cath was performed of approximately 10 mL of clear urine.  Exam

was performed and the cyst was located.  The area around the cyst was

injected with approximately 5 mL of lidocaine with epinephrine.  Under

exam it was noted that this cyst was about the size of the end of my pinky

finger and was very, very hard and actually rather than being near the

Bartholin gland was actually more in the labia majora tissue.  Now that

the patient was under anesthesia it was easier to ascertain this on the

exam.  Therefore, an incision was made just above the cyst and this was

extended down vertically approximately half the length of the labia

majora.  The cyst was carefully excised using Metzenbaums and palpation

for location of the cyst.  I continued excision of the area until the cyst

was freed.  I examined once more and felt that there was still a little

bit of the cyst left and the cyst wall was noticeable in the mucosa.  I,

therefore, continued dissection of the area until the rest of the cyst was

obtained cleanly using the Metzenbaums.  Once I felt the entire specimen

was located and removed I then inspected the defect and closed with 2-0

Vicryl internally in a running fashion.  The skin was then closed with 4-0

Vicryl in a running fashion as well.  The patient tolerated the procedure

well.  Sponge, lap and needle counts were correct x2.  At the end of the

procedure I did watch the area carefully for any signs of hematoma

formation and I saw none.  This was done for several minutes and once I

was reassured that there was no hematoma forming I then made the decision

to conclude the case and the patient was taken down and taken to recovery

in stable condition.  Sponge, lap and needle counts were correct x2.





DICTATING PHYSICIAN:  AIDE SCALES M.D.





[id number]                  DT: 2018    1516

PHY#: [id number]            DD: 2018    1455

ID:   [id number]           JOB#: [id number]       ACCT: [id number]



cc:AIDE SCALES M.D.

>

## 2018-05-18 ENCOUNTER — HOSPITAL ENCOUNTER (EMERGENCY)
Dept: HOSPITAL 62 - ER | Age: 33
Discharge: HOME | End: 2018-05-18
Payer: COMMERCIAL

## 2018-05-18 VITALS — DIASTOLIC BLOOD PRESSURE: 69 MMHG | SYSTOLIC BLOOD PRESSURE: 108 MMHG

## 2018-05-18 DIAGNOSIS — Z87.442: ICD-10-CM

## 2018-05-18 DIAGNOSIS — N30.00: Primary | ICD-10-CM

## 2018-05-18 DIAGNOSIS — Z88.2: ICD-10-CM

## 2018-05-18 DIAGNOSIS — Z88.0: ICD-10-CM

## 2018-05-18 DIAGNOSIS — G89.18: ICD-10-CM

## 2018-05-18 DIAGNOSIS — R10.30: ICD-10-CM

## 2018-05-18 LAB
ADD MANUAL DIFF: NO
ALBUMIN SERPL-MCNC: 4 G/DL (ref 3.5–5)
ALP SERPL-CCNC: 74 U/L (ref 38–126)
ALT SERPL-CCNC: 26 U/L (ref 9–52)
ANION GAP SERPL CALC-SCNC: 12 MMOL/L (ref 5–19)
APPEARANCE UR: (no result)
APTT PPP: YELLOW S
AST SERPL-CCNC: 14 U/L (ref 14–36)
BASOPHILS # BLD AUTO: 0.1 10^3/UL (ref 0–0.2)
BASOPHILS NFR BLD AUTO: 0.7 % (ref 0–2)
BILIRUB DIRECT SERPL-MCNC: 0.3 MG/DL (ref 0–0.4)
BILIRUB SERPL-MCNC: 0.4 MG/DL (ref 0.2–1.3)
BILIRUB UR QL STRIP: NEGATIVE
BUN SERPL-MCNC: 6 MG/DL (ref 7–20)
CALCIUM: 9.5 MG/DL (ref 8.4–10.2)
CHLORIDE SERPL-SCNC: 100 MMOL/L (ref 98–107)
CO2 SERPL-SCNC: 29 MMOL/L (ref 22–30)
EOSINOPHIL # BLD AUTO: 0.2 10^3/UL (ref 0–0.6)
EOSINOPHIL NFR BLD AUTO: 2 % (ref 0–6)
ERYTHROCYTE [DISTWIDTH] IN BLOOD BY AUTOMATED COUNT: 14 % (ref 11.5–14)
GLUCOSE SERPL-MCNC: 84 MG/DL (ref 75–110)
GLUCOSE UR STRIP-MCNC: NEGATIVE MG/DL
HCT VFR BLD CALC: 37.7 % (ref 36–47)
HGB BLD-MCNC: 12.8 G/DL (ref 12–15.5)
KETONES UR STRIP-MCNC: NEGATIVE MG/DL
LYMPHOCYTES # BLD AUTO: 3.4 10^3/UL (ref 0.5–4.7)
LYMPHOCYTES NFR BLD AUTO: 31.9 % (ref 13–45)
MCH RBC QN AUTO: 27.3 PG (ref 27–33.4)
MCHC RBC AUTO-ENTMCNC: 33.8 G/DL (ref 32–36)
MCV RBC AUTO: 81 FL (ref 80–97)
MONOCYTES # BLD AUTO: 0.7 10^3/UL (ref 0.1–1.4)
MONOCYTES NFR BLD AUTO: 6.6 % (ref 3–13)
NEUTROPHILS # BLD AUTO: 6.3 10^3/UL (ref 1.7–8.2)
NEUTS SEG NFR BLD AUTO: 58.8 % (ref 42–78)
NITRITE UR QL STRIP: NEGATIVE
PH UR STRIP: 7 [PH] (ref 5–9)
PLATELET # BLD: 219 10^3/UL (ref 150–450)
POTASSIUM SERPL-SCNC: 3.7 MMOL/L (ref 3.6–5)
PROT SERPL-MCNC: 6.8 G/DL (ref 6.3–8.2)
PROT UR STRIP-MCNC: NEGATIVE MG/DL
RBC # BLD AUTO: 4.67 10^6/UL (ref 3.72–5.28)
SODIUM SERPL-SCNC: 140.7 MMOL/L (ref 137–145)
SP GR UR STRIP: 1
TOTAL CELLS COUNTED % (AUTO): 100 %
UROBILINOGEN UR-MCNC: NEGATIVE MG/DL (ref ?–2)
WBC # BLD AUTO: 10.6 10^3/UL (ref 4–10.5)

## 2018-05-18 PROCEDURE — 81001 URINALYSIS AUTO W/SCOPE: CPT

## 2018-05-18 PROCEDURE — 99284 EMERGENCY DEPT VISIT MOD MDM: CPT

## 2018-05-18 PROCEDURE — 87086 URINE CULTURE/COLONY COUNT: CPT

## 2018-05-18 PROCEDURE — 81025 URINE PREGNANCY TEST: CPT

## 2018-05-18 PROCEDURE — 80053 COMPREHEN METABOLIC PANEL: CPT

## 2018-05-18 PROCEDURE — 36415 COLL VENOUS BLD VENIPUNCTURE: CPT

## 2018-05-18 PROCEDURE — 87491 CHLMYD TRACH DNA AMP PROBE: CPT

## 2018-05-18 PROCEDURE — 74177 CT ABD & PELVIS W/CONTRAST: CPT

## 2018-05-18 PROCEDURE — 87591 N.GONORRHOEAE DNA AMP PROB: CPT

## 2018-05-18 PROCEDURE — 85025 COMPLETE CBC W/AUTO DIFF WBC: CPT

## 2018-05-18 NOTE — ER DOCUMENT REPORT
ED Medical Screen (RME)





- General


Chief Complaint: Post Surgical Pain


Stated Complaint: SURGICAL PAIN


Time Seen by Provider: 18 18:14


Notes: 





RAPID MEDICAL EVALUATION DISCLOSURE


I have seen this patient as part of a Rapid Medical Evaluation and, if 

applicable, placed any initially appropriate orders. The patient will be seen 

and fully evaluated, including a full history and physical exam, by a provider (

in Main ED or Fast Track) when a room becomes available.





------------------------------------------------------------------





32-year-old female here with complaints of vaginal pain (has been taking 

ibuprofen) that has been ongoing for the past 1 weeks.  She had a vaginal cyst 

removed 2 weeks ago by Dr. Walton and her one-week follow-up went well 

however over the past 1 week the pain is worsened and today she went to an 

urgent care center that told her she needed to come get checked out at the ER 

because "something more could be going on".  However the reportedly did not 

tell her specifically what they were worried about.  They checked her urine and 

told her she did not have a urine infection.








TRAVEL OUTSIDE OF THE [place] IN LAST 30 DAYS: No





- Related Data


Allergies/Adverse Reactions: 


 





amoxicillin Allergy (Verified 18 17:16)


 


metoclopramide [From Reglan] Allergy (Verified 18 17:16)


 


Penicillins Allergy (Verified 18 17:16)


 


prochlorperazine [From Compazine] Allergy (Verified 18 17:16)


 


promethazine [From Phenergan] Allergy (Verified 18 17:16)


 


Sulfa (Sulfonamide Antibiotics) Allergy (Verified 18 17:16)


 











Past Medical History





- Past Medical History


Cardiac Medical History: Reports: Hx Hypertension - NO MEDICATION


   Denies: Hx Coronary Artery Disease, Hx Heart Attack


Pulmonary Medical History: 


   Denies: Hx Asthma, Hx Bronchitis, Hx COPD, Hx Pneumonia


Neurological Medical History: Reports: Hx Seizures - X 1 (2017), NO MEDICATION.

  Denies: Hx Cerebrovascular Accident


Renal/ Medical History: Reports: Hx Kidney Stones.  Denies: Hx Peritoneal 

Dialysis


Musculoskeltal Medical History: Denies Hx Arthritis


Past Surgical History: Reports: Hx  Section, Hx Oral Surgery - wisdom 

teeth





- Immunizations


Hx Diphtheria, Pertussis, Tetanus Vaccination: Yes


History of Influenza Vaccine for 10/2017 - 3/2018 Season: Yes


Influenza Administration Date for 10/2017 - 3/2018 Season: 18





Physical Exam





- Vital signs


Vitals: 





 











Temp Pulse Resp BP Pulse Ox


 


 98.6 F   98   16   121/73   98 


 


 18 17:21  18 17:21  18 17:21  18 17:21  18 17:21














Course





- Vital Signs


Vital signs: 





 











Temp Pulse Resp BP Pulse Ox


 


 98.6 F   98   16   121/73   98 


 


 18 17:21  18 17:21  18 17:21  18 17:21  18 17:21

## 2018-05-18 NOTE — RADIOLOGY REPORT (SQ)
EXAM DESCRIPTION:  CT ABD/PELVIS WITH IV ONLY



COMPLETED DATE/TIME:  5/18/2018 10:31 pm



REASON FOR STUDY:  RLQ pain



COMPARISON:  7/23/2017



TECHNIQUE:  CT scan of the abdomen and pelvis performed using helical scanning technique with dynamic
 intravenous contrast injection.  No oral contrast. Images reviewed with lung, soft tissue, and bone 
windows. Reconstructed coronal and sagittal MPR images reviewed. Delayed images for evaluation of the
 urinary system also acquired. All images stored on PACS.

All CT scanners at this facility use dose modulation, iterative reconstruction, and/or weight based d
osing when appropriate to reduce radiation dose to as low as reasonably achievable (ALARA).

[facility]: Dose Right  [facility]: CareDose    [facility]: Dose Right    [facility]: Teradose 4D    [facility]: Smart Technologies



CONTRAST TYPE AND DOSE:  contrast/concentration: Isovue 370.00 mg/ml; Total Contrast Delivered: 100.0
 ml; Total Saline Delivered: 72.0 ml



RENAL FUNCTION:  None required. The patient is less than 50 years old.



RADIATION DOSE:  CT Rad equipment meets quality standard of care and radiation dose reduction techniq
ues were employed. CTDIvol: 19.9 - 21.1 mGy. DLP: 2244 mGy-cm..



LIMITATIONS:  None.



FINDINGS:  LOWER CHEST: No significant findings. No nodules or infiltrates.

LIVER: Normal size. No masses.  No dilated ducts.

SPLEEN: Normal size. No focal lesions.

PANCREAS: No masses. No significant calcifications. No adjacent inflammation or peripancreatic fluid 
collections. Pancreatic duct not dilated.

GALLBLADDER: No identified stones by CT criteria. No inflammatory changes to suggest cholecystitis.

ADRENAL GLANDS: No significant masses or asymmetry.

RIGHT KIDNEY AND URETER: No solid masses.   No significant calcifications.   No hydronephrosis or hyd
roureter.

LEFT KIDNEY AND URETER: No solid masses.   No significant calcifications.   No hydronephrosis or hydr
oureter.

AORTA AND VESSELS: No aneurysm. No dissection. Renal arteries, SMA, celiac without stenosis.

RETROPERITONEUM: No retroperitoneal adenopathy, hemorrhage or masses.

BOWEL AND PERITONEAL CAVITY: No masses or inflammatory changes. No free fluid or peritoneal masses.

APPENDIX: Normal.

PELVIS: No mass.  No free fluid. Normal bladder.

ABDOMINAL WALL: No masses. No hernias.

BONES: No significant or acute findings.

OTHER: No other significant finding.



IMPRESSION:  NO SIGNIFICANT OR ACUTE FINDING IN THE ABDOMEN OR PELVIS ON CT SCAN WITH IV CONTRAST.



TECHNICAL DOCUMENTATION:  JOB ID:  [id number]

Quality ID # [id number]: Final reports with documentation of one or more dose reduction techniques (e.g., Au
tomated exposure control, adjustment of the mA and/or kV according to patient size, use of iterative 
reconstruction technique)

 2011 [employer]- All Rights Reserved



Reading location - IP/workstation name: DO

## 2018-05-18 NOTE — ER DOCUMENT REPORT
ED General





- General


Chief Complaint: Post Surgical Pain


Stated Complaint: SURGICAL PAIN


Time Seen by Provider: 18 18:14


Mode of Arrival: Ambulatory


Information source: Patient


TRAVEL OUTSIDE OF THE [place] IN LAST 30 DAYS: No





- HPI


Notes: 





32-year-old female here with complaints of vaginal pain (has been taking 

ibuprofen) that has been ongoing for the past 1 weeks.  She had a vaginal cyst 

removed 2 weeks ago by Dr. Walton and her one-week follow-up went well 

however over the past 1 week the pain is worsened and today she went to an 

urgent care center that told her she needed to come get checked out at the ER 

because "something more could be going on".  However the reportedly did not 

tell her specifically what they were worried about.  They checked her urine and 

told her she did not have a urine infection.





The patient reports her lower pelvic pain has progressed over the course of the 

day and seems slightly right greater than left sided.  She questions a urinary 

tract infection based upon symptoms with mild urinary frequency and urgency.  

She denies any flank pain.  She states she had a temperature of 100.0 earlier.











- Related Data


Allergies/Adverse Reactions: 


 





amoxicillin Allergy (Verified 18 17:16)


 


metoclopramide [From Reglan] Allergy (Verified 18 17:16)


 


Penicillins Allergy (Verified 18 17:16)


 


prochlorperazine [From Compazine] Allergy (Verified 18 17:16)


 


promethazine [From Phenergan] Allergy (Verified 18 17:16)


 


Sulfa (Sulfonamide Antibiotics) Allergy (Verified 18 17:16)


 











Past Medical History





- General


Information source: Patient





- Social History


Smoking Status: Former Smoker


Frequency of alcohol use: Rare


Drug Abuse: None


Lives with: Alone


Family History: Reviewed & Not Pertinent


Patient has suicidal ideation: No


Patient has homicidal ideation: No





- Past Medical History


Cardiac Medical History: Reports: Hx Hypertension - NO MEDICATION


   Denies: Hx Coronary Artery Disease, Hx Heart Attack


Pulmonary Medical History: 


   Denies: Hx Asthma, Hx Bronchitis, Hx COPD, Hx Pneumonia


Neurological Medical History: Reports: Hx Seizures - X 1 (2017), NO MEDICATION.

  Denies: Hx Cerebrovascular Accident


Renal/ Medical History: Reports: Hx Kidney Stones.  Denies: Hx Peritoneal 

Dialysis


Musculoskeltal Medical History: Denies Hx Arthritis


Past Surgical History: Reports: Hx  Section, Hx Oral Surgery - wisdom 

teeth





- Immunizations


Hx Diphtheria, Pertussis, Tetanus Vaccination: Yes





Review of Systems





- Review of Systems


Notes: 





REVIEW OF SYSTEMS:


CONSTITUTIONAL :  Denies fever,  chills, or sweats.  Denies recent illness.


EENT:   Denies eye, ear, throat, or mouth pain or symptoms.  Denies nasal or 

sinus congestion or discharge.  Denies throat, tongue, or mouth swelling or 

difficulty swallowing.


CARDIOVASCULAR:  Denies chest pain.  Denies palpitations or racing or irregular 

heart beat.  Denies ankle edema.


RESPIRATORY:  Denies cough, cold, or chest congestion.  Denies shortness of 

breath, difficulty breathing, or wheezing.


GASTROINTESTINAL:  Denies abdominal distention.  Denies nausea, vomiting, or 

diarrhea.  Denies blood in vomitus, stools, or per rectum.  Denies black, tarry 

stools.  Denies constipation. 


GENITOURINARY:  Denies blood in urine, or discharge.


FEMALE  GENITOURINARY:  Denies vaginal bleeding, heavy or abnormal periods, 

irregular periods.  Denies vaginal discharge or odor. 


MUSCULOSKELETAL:  Denies back or neck pain or stiffness.  Denies joint pain or 

swelling.


SKIN:   Denies rash, lesions or sores.


HEMATOLOGIC :   Denies easy bruising or bleeding.


LYMPHATIC:  Denies swollen, enlarged glands.


NEUROLOGICAL:  Denies confusion or altered mental status.  Denies passing out 

or loss of consciousness.  Denies dizziness or lightheadedness.  Denies 

headache.  Denies weakness or paralysis or loss of use of either side.  Denies 

problems with gait or speech.  Denies sensory loss, numbness, or tingling.  

Denies seizures.


PSYCHIATRIC:  Denies anxiety or stress.  Denies depression, suicidal ideation, 

or homicidal ideation.





ALL OTHER SYSTEMS REVIEWED AND NEGATIVE.








Dictation was performed using Dragon voice recognition software





Physical Exam





- Vital signs


Vitals: 


 











Temp Pulse Resp BP Pulse Ox


 


 98.6 F   98   16   121/73   98 


 


 18 17:21  18 17:21  18 17:21  18 17:21  18 17:21














- Notes


Notes: 





PHYSICAL EXAMINATION:





GENERAL: Well-appearing, well-nourished and in no acute distress.





HEAD: Atraumatic, normocephalic.





EYES: Pupils equal round and reactive to light, extraocular movements intact, 

conjunctiva are normal.





ENT: Nares patent, oropharynx clear without exudates.  Moist mucous membranes.





NECK: Normal range of motion, supple without lymphadenopathy





LUNGS: Breath sounds clear to auscultation bilaterally and equal.  No wheezes 

rales or rhonchi.





HEART: Regular rate and rhythm without murmurs





ABDOMEN: Soft nondistended abdomen.  No guarding, no rebound.  No masses 

appreciated.  Tender suprapubic and slightly right greater than left.  No mass 

noted.





Female : Patient has a Bartholin's surgical site on the left which appears to 

be healing very well without evidence for inflammation or infection.  Otherwise 

normal external female genitalia.  Cervix appears benign there is no 

significant discharge.  There is mild pain to the suprapubic region.  Cannot 

completely exclude pain to the uterine area but there is no significant 

cervical motion tenderness.  No adnexal mass, but patient does have pain 

slightly right greater than left.





Musculoskeletal: Normal range of motion, no pitting or edema.  No cyanosis.





NEUROLOGICAL: Cranial nerves grossly intact.  Normal speech, normal gait.  

Normal sensory, motor exams





PSYCH: Normal mood, normal affect.





SKIN: Warm, Dry, normal turgor, no rashes or lesions noted.





Course





- Re-evaluation


Re-evalutation: 





18 22:53


CT scan was negative for appendicitis.  Lab work was benign.  Urine specimen 

showed evidence for UTI.  Urine culture was taken and the patient will be 

started on Cipro given her allergies.  Specimen was sent for gonorrhea and 

chlamydia during the pelvic exam.  There is no evidence for diverticulitis or 

ovarian abscess or obvious ovarian cyst.  No evidence for sepsis.  Unlikely PID 

given the findings.





- Vital Signs


Vital signs: 


 











Temp Pulse Resp BP Pulse Ox


 


 98.6 F   98   16   121/73   98 


 


 18 17:21  18 17:21  18 17:21  18 17:21  18 17:21














- Laboratory


Result Diagrams: 


 18 21:32





 18 21:32


Laboratory results interpreted by me: 


 











  18





  19:36 21:32 21:32


 


WBC   10.6 H 


 


BUN    6 L


 


Urine Ascorbic Acid  20 H

## 2018-05-19 LAB
CHLAM PCR: NOT DETECTED
GON PCR: NOT DETECTED

## 2018-06-08 ENCOUNTER — HOSPITAL ENCOUNTER (EMERGENCY)
Dept: HOSPITAL 62 - ER | Age: 33
Discharge: HOME | End: 2018-06-08
Payer: COMMERCIAL

## 2018-06-08 VITALS — SYSTOLIC BLOOD PRESSURE: 125 MMHG | DIASTOLIC BLOOD PRESSURE: 77 MMHG

## 2018-06-08 DIAGNOSIS — R39.15: ICD-10-CM

## 2018-06-08 DIAGNOSIS — Z98.51: ICD-10-CM

## 2018-06-08 DIAGNOSIS — R19.7: Primary | ICD-10-CM

## 2018-06-08 DIAGNOSIS — I10: ICD-10-CM

## 2018-06-08 DIAGNOSIS — Z87.440: ICD-10-CM

## 2018-06-08 DIAGNOSIS — Z87.442: ICD-10-CM

## 2018-06-08 LAB
APPEARANCE UR: CLEAR
APTT PPP: YELLOW S
BACTERIA (WET MOUNT): (no result)
BILIRUB UR QL STRIP: NEGATIVE
CHLAM PCR: NOT DETECTED
GLUCOSE UR STRIP-MCNC: NEGATIVE MG/DL
GON PCR: NOT DETECTED
KETONES UR STRIP-MCNC: NEGATIVE MG/DL
NITRITE UR QL STRIP: NEGATIVE
PH UR STRIP: 5 [PH] (ref 5–9)
PROT UR STRIP-MCNC: NEGATIVE MG/DL
SP GR UR STRIP: 1.01
T.VAGINALIS (WET MOUNT): (no result)
UROBILINOGEN UR-MCNC: NEGATIVE MG/DL (ref ?–2)
WBCS (WET MOUNT): (no result)
YEAST (WET MOUNT): (no result)

## 2018-06-08 PROCEDURE — 99283 EMERGENCY DEPT VISIT LOW MDM: CPT

## 2018-06-08 PROCEDURE — 87086 URINE CULTURE/COLONY COUNT: CPT

## 2018-06-08 PROCEDURE — 87491 CHLMYD TRACH DNA AMP PROBE: CPT

## 2018-06-08 PROCEDURE — 87591 N.GONORRHOEAE DNA AMP PROB: CPT

## 2018-06-08 PROCEDURE — 81001 URINALYSIS AUTO W/SCOPE: CPT

## 2018-06-08 PROCEDURE — 87210 SMEAR WET MOUNT SALINE/INK: CPT

## 2018-06-08 PROCEDURE — 81025 URINE PREGNANCY TEST: CPT

## 2018-06-08 NOTE — ER DOCUMENT REPORT
HPI





- HPI


Pain Level: 3


Context: 





Patient is a 32-year-old female who presents emergency room with a chief 

complaint urinary urgency, frequency with very mild pyuria.  Patient states 

that she was seen here approximately 3 weeks ago and sent home on a seven-day 

course of Cipro for UTI.  Patient states that her symptoms did not improve 

after antibiotic.  She states that she is noticed urinary frequency over the 

past 2 days and with her associated loose bowel movements has intermittent 

burning post void.  Patient did not follow up with her primary care provider.  

Patient states that she does have a history of recurrent UTIs and had 

previously followed with the urologist in her previous home state but does not 

establish care with one since moving to [place] over a year ago.  

Patient admits to associated loose bowel movements for the past 3 days without 

any nausea, vomiting, fevers, chills or focal abdominal pain. States her son 

had these symptoms before her 





Patient's last menstrual period was May 25, admits to tubal ligation.  Denies 

any pelvic pain, vaginal discharge





- URINARY


Urinary: REPORTS: Urgency, Frequency





- REPRODUCTIVE


LMP: 18





Past Medical History





- Social History


Smoking Status: Unknown if Ever Smoked


Family History: Reviewed & Not Pertinent


Patient has suicidal ideation: No


Patient has homicidal ideation: No





- Past Medical History


Cardiac Medical History: Reports: Hx Hypertension - NO MEDICATION


   Denies: Hx Coronary Artery Disease, Hx Heart Attack


Pulmonary Medical History: 


   Denies: Hx Asthma, Hx Bronchitis, Hx COPD, Hx Pneumonia


Neurological Medical History: Reports: Hx Seizures - X 1 (2017), NO MEDICATION.

  Denies: Hx Cerebrovascular Accident


Renal/ Medical History: Reports: Hx Kidney Stones.  Denies: Hx Peritoneal 

Dialysis


Musculoskeltal Medical History: Denies Hx Arthritis


Past Surgical History: Reports: Hx  Section, Hx Oral Surgery - wisdom 

teeth





- Immunizations


Hx Diphtheria, Pertussis, Tetanus Vaccination: Yes





Vertical Provider Document





- CONSTITUTIONAL


Agree With Documented VS: Yes


Notes: 





PHYSICAL EXAM


GENERAL: Alert, interacts well. 


HEAD: Normocephalic, atraumatic.


EYES: Pupils equal, round, and reactive to light. Extraocular movements intact.


ENT: Oral mucosa moist, tongue midline. 


NECK: Full range of motion. Supple. Trachea midline.


LUNGS: Clear to auscultation bilaterally, no wheezes, rales, or rhonchi. No 

respiratory distress.


HEART: Regular rate and rhythm. No murmurs, gallops, or rubs.


ABDOMEN: Soft,  nondistended, nontender. No guarding, rebound, or rigidity.. 

Bowel sounds present in all 4 quadrants.


FEMALE : Normal external exam.  No evidence of lesions, lacerations, bruising 

or vesicles.  Speculum exam normal cervix closed.  No evidence of vaginal 

discharge with odor.  No evidence of lesions.  No vaginal bleeding.  Bimanual 

exam normal no cervical motion tenderness.  No adnexal mass or adnexal 

tenderness.


EXTREMITIES: Moves all 4 extremities spontaneously. No edema, radial and 

dorsalis pedis pulses 2/4 bilaterally. No cyanosis. 


NEUROLOGICAL: Alert and oriented x4. Normal speech.


PSYCH: Normal affect, normal mood.


SKIN: Warm, dry, normal turgor. No rashes or lesions noted.








- INFECTION CONTROL


TRAVEL OUTSIDE OF THE [place] IN LAST 30 DAYS: No





Course





- Re-evaluation


Re-evalutation: 





18 15:22


Patient is a 32-year-old female is hemodynamically stable, no acute distress 

afebrile.  Urinalysis without any evidence of bacteria, concerns for UTI.  

Chlamydia and gonorrhea done 3 weeks ago negative.  Wet mount today negative 

for yeast or bacterial vaginitis.  Educated patient on utilizing 31 for her 

diarrhea.  No concerns for C. difficile given patient afebrile, no abdominal 

tenderness and has only had 2 days of loose bowel movements with 2 loose bowel 

movements a day.  Did discuss with her to follow-up with her primary care 

provider.  Will discharge home








- Vital Signs


Vital signs: 


 











Temp Pulse Resp BP Pulse Ox


 


 98.6 F   81   16   125/77   100 


 


 18 13:51  18 13:51  18 13:51  18 13:51  18 13:51














Discharge





- Discharge


Clinical Impression: 


Diarrhea


Qualifiers:


 Diarrhea type: unspecified type Qualified Code(s): R19.7 - Diarrhea, 

unspecified





Condition: Good


Disposition: HOME, SELF-CARE


Instructions:  Diarrhea, Nonspecific ([facility])


Additional Instructions: 


[facility]


[address] [place] [address]


[phone]


Referrals: 


JASMIN PAIZ PA-C [PHYSICIAN ASSISTANT] - Follow up tomorrow

## 2021-08-04 NOTE — RADIOLOGY REPORT (SQ)
EXAM DESCRIPTION:

CT SOFT TISSUE NECK WITH



CLINICAL HISTORY:

32 years Female, sore throat, dyspnea



COMPARISON:

None.



TECHNIQUE:

75 mL Isovue-370 contrast.  Coronal and sagittal reformat.  This

exam was performed according to our departmental

dose-optimization program, which includes automated exposure

control, adjustment of the mA and/or kV according to patient size

and/or use of iterative reconstruction technique.



FINDINGS:



Mild/moderate bilateral suprahyoid cervical lymphadenopathy

includes a 1.6 x 1.3 cm right-sided lymph node and 1.4 x 1.3 cm

left side lymph node, image 46 of series 2. No drainable fluid

collection. No abscess. No suspicious mass.

Moderate C5-C6 disc desiccation and mild/moderate diffuse

reversed lordotic curvature of the cervical spine. Inferior

cranium, and upper thorax appear otherwise grossly intact.



IMPRESSION:



1. Moderate bilateral cervical lymphadenopathy.

2. Small C5-C6 disc bulge. Reversed lordotic curvature of

cervical spine. Yes